# Patient Record
Sex: FEMALE | Race: WHITE | Employment: FULL TIME | URBAN - NONMETROPOLITAN AREA
[De-identification: names, ages, dates, MRNs, and addresses within clinical notes are randomized per-mention and may not be internally consistent; named-entity substitution may affect disease eponyms.]

---

## 2020-08-28 ENCOUNTER — TELEPHONE (OUTPATIENT)
Dept: ADMINISTRATIVE | Age: 50
End: 2020-08-28

## 2020-09-04 ENCOUNTER — TELEMEDICINE (OUTPATIENT)
Dept: FAMILY MEDICINE CLINIC | Age: 50
End: 2020-09-04
Payer: COMMERCIAL

## 2020-09-04 PROCEDURE — 99204 OFFICE O/P NEW MOD 45 MIN: CPT | Performed by: FAMILY MEDICINE

## 2020-09-04 RX ORDER — MULTIVITAMIN/IRON/FOLIC ACID 18MG-0.4MG
1 TABLET ORAL
COMMUNITY

## 2020-09-04 RX ORDER — PHENOL 1.4 %
1 AEROSOL, SPRAY (ML) MUCOUS MEMBRANE DAILY
COMMUNITY

## 2020-09-04 RX ORDER — CHLORAL HYDRATE 500 MG
1 CAPSULE ORAL
COMMUNITY

## 2020-09-04 ASSESSMENT — ENCOUNTER SYMPTOMS: CONSTIPATION: 1

## 2020-09-04 NOTE — PROGRESS NOTES
Take 1 capsule by mouth every other day, Disp: , Rfl:     Magnesium Oxide 250 MG TABS, Take 1 tablet by mouth 4 times daily (before meals and nightly), Disp: , Rfl:     NONFORMULARY, Take 1 capsule by mouth every evening Maatein in evening, Disp: , Rfl:     Omega-3 1000 MG CAPS, Take 1 capsule by mouth 4 times daily (before meals and nightly) [facility] grade, Disp: , Rfl:     calcium carbonate 600 MG TABS tablet, Take 1 tablet by mouth daily [facility] plain, Disp: , Rfl:     B Complex-Folic Acid (N-545 BALANCED TR PO), Take 1 tablet by mouth 3 times daily (before meals) [id number] [facility] [employer]. com, Disp: , Rfl:   Allergies: Patient has no allergy information on record.,  Immunizations: There is no immunization history on file for this patient. History of PresentIllness:     Sylvie's had concerns including Anxiety; Headache; Joint Pain; Insomnia; and Mood Swings. .Sylvie  presents to the [address] [place] [address] for;   Chief Complaint   Patient presents with    Anxiety    Headache    Joint Pain    Insomnia    Mood Swings   , ,  abnormal labs follow up and these conditions as she  Is looking today for:     1. Tired    2. Acne vulgaris    3. Slow transit constipation    4. Chronic tension-type headache, not intractable    5. Anxiety    6. Primary insomnia    7. Mood change    8. Irritable bowel syndrome with constipation      HPI    Subjective:     Review of Systems   Gastrointestinal: Positive for constipation. Genitourinary: Positive for frequency. Musculoskeletal: Positive for arthralgias and joint swelling. Neurological: Positive for headaches. Psychiatric/Behavioral: Positive for dysphoric mood and sleep disturbance. The patient is nervous/anxious. All other systems reviewed and are negative. Objective: There were no vitals taken for this visit. Physical Exam  Constitutional:       Appearance: Normal appearance. HENT:      Head: Normocephalic.    Pulmonary: Effort: Pulmonary effort is normal.   Neurological:      Mental Status: She is alert. Psychiatric:         Mood and Affect: Mood normal.         Thought Content: Thought content normal.            Laboratory Data:   Lab results were searched in Care Everywhere and/or those brought by the pateint were reviewed today with Sylvie and she has a copy of their most recent labs to take home with them as notedbelow;       Imaging Data:   Imaging Data:       Assessment & Plan:       Impression:  1. Tired    2. Acne vulgaris    3. Slow transit constipation    4. Chronic tension-type headache, not intractable    5. Anxiety    6. Primary insomnia    7. Mood change    8. Irritable bowel syndrome with constipation      Assessment and Plan:  After reviewing the patients chief complaints, reviewing their labfindings in great detail (with the patient and those accompanying them) which correlate to their chief complaints, symptoms, and or medical conditions; suggestions were made relating to changes in diet and or supplementswhich may improve the complaints and which will be reflected in their future lab findings; Chief Complaint   Patient presents with    Anxiety    Headache    Joint Pain    Insomnia    Mood Swings   ;    Plans for the next visits:  - Abnormal and non-optimal Labs were ordered today to be repeated in the next 120-365 days to assess changes from adjustments in nutrition and or nutrients. - Patient instructed when having ablood draw to ask the  to divide their lab draws into multiple draws over several days if not feeling good at the time of the lab draw or if either prefers to do several smaller blood draws over several days  -Patient instructed to check with insurer before each lab draw and to to to the lab which the insurer directs them for the most cost effective lab draw with the least patient's cost  - Sylvie  will be scheduled subsequentto those results.   - Sylvie will bring in her drink and food log to her next visit    Chronic Problems Addressed on this Visit:                                   1.  Intensity of Service; Uncontrolled items at this visit; Chief Complaint   Patient presents with    Anxiety    Headache    Joint Pain    Insomnia    Mood Swings   ; Improved items at this visit; Stable items atthis visit;  2. Patients food and drinks were reviewed with the patient,       - Sylvie will bring food+drink symptom log to next visit for inclusion in their record      - 75 better food list reviewed & given topatient with the omega 6 food list to avoid         - Gluten in corn and oats abstracts sheet reviewed and given to the patient today   3. Greater than 45 GT minutes were spent face to face on this visit of which >50% was for counseling and coordination of care; by video visit which was performed via a '[employer] telecommunication system and the Location of the Patient was in their Home, while the Location of the provider was in the provider's home. Patients food and drinks were reviewed with thepatient,   - they will bring a food drink symptom log to future visits for inclusion in their record    - 75 better food list reviewed & given to patient along with the omega 6 food list to avoid      - Glutenin corn and oats abstracts sheet reviewed and given to the patient today    - 23 Foods containing Latex-like proteins was reviewed and copy to be taken if desired     - Nutrient Supplements list provided and copyto be taken if desired    - [employer] web site offered to patient to review at their convenience by staff with login information    Note:  I have discussed with the patient that with all nutraceuticals, there is often mixed data and emerging research which needs to be monitored; as well as an array of NIHfact sheets on nutrients and supplements.      If I have recommended cinnamon at the request of this patient to assist them in control of their 1000 mg / serving of 2 capsules,    some brands have 1000 mg caps with the undesireable chromium / extract  - Calcium carbonate/citrate, magnesium oxide/citrate, Vit D 3  as 3-4 tabs/caps/serving     Some Local Brands may contain Zincwhich is acceptable for the first bottle or two  - Magnesium oxide 250 mg tabs for those having < 2 bowel movements daily  - Magnesium citrate 200 mg if having > 2bowel movement/day  - Centrum Silver or look-a-like for most patients, Centrum plain or look-a-like with iron  - Vitamin D-3 comes as 1,000 IU or 2,000 IU or 5,000 IU gel caps or Liquid drops      Some brands containing or derived from soy oil or corn oil are OK if not allergic to soy  - Elemental Iron 65 mg tabsat bedtime is available over the counter if need more iron     Usually turns bowel movements grey, green or black but not a concern  - Apricot Kernel Oil (by Now) for dry skin sensitive perineal or perianal area skin    Nutrients for ongoing use by Mail order for less expense from [employer] ;  - Strength Fish Oil , 240 Softgels Item #437806  -B-100 time released balanced B complex Item #[id number]  - Cinnamon bark 500 mg without Chromium or extract Item #344437  - Calcium carbonate 1000 mg, Magnesium oxide 500 mg, Vit D 3  400 IU Item #170925  - Magnesium oxide 500 mg tabs Item #998741 if less than 2 bowel movements daily  - [employer] Item #608138 for mostpatients, One Daily Item #771287 with iron  - Vit D 3  1,000 Item #471523      2,000 IU Item #206092  ,000 IU Item #965798     Some brands containing orderived from soy oil or corn oil are OK if not allergic to soy    Nutrients for Special Needs by Shante Tang for less expense from www. [employer].com ;  -Elemental Iron 65 mg tabs Item #501321 if need more iron for low iron on labs    Usually turns bowel movements grey, green or black but not a concern  - Time released Niacin 250 mg Item #696499 for cold intolerance, low libido or impotence  - DHEA 50 mg Item #553339 Tomato. Avocado, Banana, Celery, Figs, and Kiwi always contain Latex-like protein. Whats in Season? Strawberries taste better in June than December because June is strawberry season so buy locally grown produce \"in season\" for the best flavor, cost and less Latex. Locally grown produce notonly tastes great requires little of no ethylene exposure in food distribution so has less latex content. Out of season, use canned, frozen or dried sinceprocessed ripe and are latex lower!!!   Month     [place] LocallyGrown Produce  January, February, March: use canned, frozen or dried fruits since lower in latex  April; asparagus, radishes  May; asparagus, broccoli, green onions, greens, peas, radishes,rhubarb  June; asparagus, beets, beans, broccoli, cabbage, cantaloupe, carrots, green onions, greens, lettuce,onions, parsley, peas, radishes, rhubarb, strawberries, watermelons  July; beans, beets, blueberries,broccoli, cabbage, cantaloupe, carrots, cauliflower, celery, cucumbers, eggplant, grapes, green onions, greens, lettuce, onions, parsley, peas, peaches, bell peppers, potatoes, radishes, summer raspberries, squash, sweetcorn, tomatoes, turnips, watermelons  August; apples, beans, beets, blueberries, cabbage, cantaloupe, carrots,cauliflower, celery, cucumbers, eggplant, grapes, green onions, greens, lettuce, onions, parsley, peas, peaches, pears, bell peppers, potatoes, radishes, squash, sweet corn, tomatoes, turnips, watermelons  September; apples, beans, beets, blueberries, cabbage, cantaloupe, carrots, cauliflower, celery, cucumbers, eggplant, grapes,green onions, greens, lettuce, onions, parsley, peas, peaches, pears, bell peppers, plums, potatoes, pumpkins, radishes, fall red raspberries, squash, sweet corn, tomatoes, turnips, watermelons  October; apples, beets, broccoli, cabbage, carrots, cauliflower, celery, green onions, greens, lettuce, parsley, peas, pears, potatoes,pumpkins, radishes, fall red raspberries, squash, turnips  November; broccoli, cabbage, carrots, parsley,pears, peas  December: use canned, frozen ordried fruits since lower in latex    Upto half of latex-sensitive patients show allergic reactions to fruits (avocados, bananas, kiwifruits, papayas, peaches),   Annals of Allergy, 1994. These plants contain the same proteins that are allergens in latex. People with fruit allergies should warn physicians beforeundergoing procedures which may cause anaphylactic reaction if in contact with latex gloves. Some of the common foods with defined cross-reactivity to latexare avocado, banana, kiwi, chestnut, raw potato, tomato,stone fruits (e.g., peach, cherry), hazelnut, melons, celery, carrot, apple, pear, papaya, and almond. Foods with less well-defined cross-reactivity to latex are peanuts, peppers, citrus fruits, coconut, pineapple, chon,fig, passion fruit, Ugli fruit, and grape    This fruit/latex cross-reactivity is worsened by ethylene, a gas used to hasten commercial ripening. In nature, plants produce low levels of the hormone ethylene, which regulates germination, flowering, and ripening. Forced ripening by high ethyleneconcentrations, plants produce allergenic wound-repair proteins, which are similar to wound-repair proteins made during the tapping of rubber trees. Sensitive individualswho ingest the fruit get a higher dose and worse reaction. Some people may even first become sensitized to latex through fruit. Can food processing increase theconcentrations of allergenic proteins? Latex-sensitized children (and adults) in [place] often experience allergic reactions after eating bananas ripenedartificially with ethylene. In the [place], food distribution centers treat unripe bananas and other produce with ethylene to ripen; not commonly done in [facility] since fruit is tree-ripened there. Does treatmentof food with ethylene induce banana proteins that cross-react with latex?  [facility] et al.    References:   Latex in Foods Allergy, http://ehp.niehs.nih.gov/members/2003/5811/5811.html    Search web for [employer] in Season \" for whereyou live or are at the time you food shop  www.nutritioncouncil.org/pdf/healthy/SeasonalProduce. pdf ,   Management of Latex, ://[email or web address]. [email or web address].edu/  search for latex